# Patient Record
Sex: MALE | Race: WHITE
[De-identification: names, ages, dates, MRNs, and addresses within clinical notes are randomized per-mention and may not be internally consistent; named-entity substitution may affect disease eponyms.]

---

## 2021-03-09 ENCOUNTER — HOSPITAL ENCOUNTER (OUTPATIENT)
Dept: HOSPITAL 46 - DS | Age: 36
Discharge: HOME | End: 2021-03-09
Attending: OTOLARYNGOLOGY
Payer: COMMERCIAL

## 2021-03-09 VITALS — HEIGHT: 76 IN | BODY MASS INDEX: 34.77 KG/M2 | WEIGHT: 285.5 LBS

## 2021-03-09 DIAGNOSIS — R20.2: ICD-10-CM

## 2021-03-09 DIAGNOSIS — R20.0: ICD-10-CM

## 2021-03-09 DIAGNOSIS — L98.8: ICD-10-CM

## 2021-03-09 DIAGNOSIS — F17.220: ICD-10-CM

## 2021-03-09 DIAGNOSIS — R13.10: Primary | ICD-10-CM

## 2021-03-09 DIAGNOSIS — K21.9: ICD-10-CM

## 2021-03-09 PROCEDURE — 0DJ08ZZ INSPECTION OF UPPER INTESTINAL TRACT, VIA NATURAL OR ARTIFICIAL OPENING ENDOSCOPIC: ICD-10-PCS | Performed by: OTOLARYNGOLOGY

## 2021-03-09 NOTE — OR
[facility]
                                    [address], [place]  [address]
_________________________________________________________________________________________
                                                                 Signed   
 
 
DATE OF OPERATION:
03/09/2021
 
SURGEON:
Armand Marquez MD
 
PREOPERATIVE DIAGNOSIS:
Dysphagia.
 
POSTOPERATIVE DIAGNOSIS:
Dysphagia.
 
PROCEDURE:
Direct laryngoscopy, direct esophagoscopy.
 
ANESTHESIA:
General orotracheal; CRNA, Anya.
 
PREOPERATIVE HISTORY:
Mr. Ibarra is a 35-year-old man with a foreign body sensation in his throat.  Several
months ago, he was eating [place] chicken, felt like he swallowed a toothpick, he
has had persistent symptoms pointing to the inferior laryngeal area bilaterally since
that episode.  Exam in the office has shown no foreign bodies.  He was taken to the
operating room for exam under anesthesia and the above-mentioned procedures. 
 
OPERATIVE PROCEDURE AND FINDINGS:
After informed consent, the patient was taken to the operating room, placed in supine
position where general orotracheal anesthesia was induced.  The patient and procedure
were verified.  The patient was repositioned.  The anterior commissure laryngoscope was
used to visualize the hypopharynx and larynx, thus mild diffuse inflammation of the
larynx, vocal cords, arytenoids, but otherwise no abnormalities, pyriforms were clear.
No pooling.  No foreign bodies.  The scope was removed.  The cervical esophagoscope was
then passed down to about 25 cm.  No abnormalities identified on the way in or out.  No
foreign bodies.  No inflammation, pooling, etc.  The scope was removed.  The patient
tolerated the procedure well, was awakened, extubated, transported to the recovery room
in good condition.  No complications. 
 
BLOOD LOSS:
Minimal.
 
SPECIMEN:
No specimen.
 
    Electronically Signed By: ARMAND MARQUEZ MD  03/09/21 1419
_________________________________________________________________________________________
PATIENT NAME:     DAVE IBARRA                 
MEDICAL RECORD #: [id number]            OPERATIVE REPORT              
          ACCT #: [id number]  
DATE OF BIRTH:   [birth year]            REPORT #: [id number]      
PHYSICIAN:        ARMAND MARQUEZ MD              
PCP:              MADISON VARGAS MD           
REPORT IS CONFIDENTIAL AND NOT TO BE RELEASED WITHOUT AUTHORIZATION
 
 
                                  [facility]
                                    [address] [place] [address]  [id number]
_________________________________________________________________________________________
                                                                 Signed   
 
 
 
DRAINS:
No drains.
 
 
 
            ________________________________________
            Armand Marquez MD 
 
 
GC/MODL
Job #:  [id number]
DD:  03/09/2021 09:51:27
DT:  03/09/2021 10:54:03
 
 
Copies:                                
~
 
 
 
 
 
 
 
 
 
 
 
 
 
 
 
 
 
 
 
 
 
 
 
 
 
    Electronically Signed By: ARMAND MARQUEZ MD  03/09/21 1419
_________________________________________________________________________________________
PATIENT NAME:     DAVE IBARRA                 
MEDICAL RECORD #: [id number]            OPERATIVE REPORT              
          ACCT #: [id number]  
DATE OF BIRTH:   [birth year]            REPORT #: [id number]      
PHYSICIAN:        ARMAND MARQUEZ MD              
PCP:              MADISON VARGAS MD           
REPORT IS CONFIDENTIAL AND NOT TO BE RELEASED WITHOUT AUTHORIZATION

## 2021-03-09 NOTE — NUR
03/09/21 0951 Lucille Young
0946-PATIENT ARRIVED TO PACU ON 6L MASK RR EVEN NONAROUSABLE. IVF
INFUSING. SR. NO DRAINAGE NOTED.
 
0949-PATIENT AROUSING TO VERBAL STIMULI COUGHING HOB ELEVATED. NO
SPUTUM SEEN. PATIENT ORIENTED TO PACU DENIES PAIN OR NAUSEA. PATIENT
DROWSY DOZES BACK TO SLEEP